# Patient Record
Sex: MALE | Race: WHITE | Employment: FULL TIME | URBAN - NONMETROPOLITAN AREA
[De-identification: names, ages, dates, MRNs, and addresses within clinical notes are randomized per-mention and may not be internally consistent; named-entity substitution may affect disease eponyms.]

---

## 2017-01-19 ENCOUNTER — OFFICE VISIT (OUTPATIENT)
Dept: FAMILY MEDICINE | Facility: OTHER | Age: 32
End: 2017-01-19
Attending: FAMILY MEDICINE
Payer: COMMERCIAL

## 2017-01-19 VITALS
HEIGHT: 69 IN | TEMPERATURE: 98 F | BODY MASS INDEX: 29.92 KG/M2 | RESPIRATION RATE: 16 BRPM | WEIGHT: 202 LBS | DIASTOLIC BLOOD PRESSURE: 78 MMHG | HEART RATE: 66 BPM | SYSTOLIC BLOOD PRESSURE: 120 MMHG

## 2017-01-19 DIAGNOSIS — H66.91 ACUTE OTITIS MEDIA, RIGHT: ICD-10-CM

## 2017-01-19 DIAGNOSIS — Z76.89 ENCOUNTER TO ESTABLISH CARE: ICD-10-CM

## 2017-01-19 DIAGNOSIS — H61.21 IMPACTED CERUMEN OF RIGHT EAR: Primary | ICD-10-CM

## 2017-01-19 PROCEDURE — 99203 OFFICE O/P NEW LOW 30 MIN: CPT | Mod: 25 | Performed by: FAMILY MEDICINE

## 2017-01-19 PROCEDURE — 69210 REMOVE IMPACTED EAR WAX UNI: CPT | Performed by: FAMILY MEDICINE

## 2017-01-19 RX ORDER — AMOXICILLIN 875 MG
875 TABLET ORAL 2 TIMES DAILY
Qty: 14 TABLET | Refills: 0 | Status: SHIPPED | OUTPATIENT
Start: 2017-01-19 | End: 2017-01-26

## 2017-01-19 ASSESSMENT — PAIN SCALES - GENERAL: PAINLEVEL: NO PAIN (0)

## 2017-01-19 NOTE — MR AVS SNAPSHOT
"              After Visit Summary   2017    Selwyn Felipe Jr    MRN: [id number]           Patient Information     Date Of Birth          1985        Visit Information        Provider Department      2017 2:00 PM [facility]i [facility]        Today's Diagnoses     Impacted cerumen of right ear    -  1     Acute otitis media, right         Encounter to establish care           Care Instructions    Complete antibiotic course.  Follow up as needed.        Follow-ups after your visit        Who to contact     If you have questions or need follow up information about today's clinic visit or your schedule please contact [facility] directly at [phone].  Normal or non-critical lab and imaging results will be communicated to you by MyChart, letter or phone within 4 business days after the clinic has received the results. If you do not hear from us within 7 days, please contact the clinic through MyChart or phone. If you have a critical or abnormal lab result, we will notify you by phone as soon as possible.  Submit refill requests through [employer] or call your pharmacy and they will forward the refill request to us. Please allow 3 business days for your refill to be completed.          Additional Information About Your Visit        MyChart Information     [employer] lets you send messages to your doctor, view your test results, renew your prescriptions, schedule appointments and more. To sign up, go to [email or web address] . Click on \"Log in\" on the left side of the screen, which will take you to the Welcome page. Then click on \"Sign up Now\" on the right side of the page.     You will be asked to enter the access code listed below, as well as some personal information. Please follow the directions to create your username and password.     Your access code is: [email or web address]  Expires: 2017  2:18 PM     Your access code will  in 90 days. If you need help or a " "new code, please call your [facility] clinic or [phone].        Care EveryWhere ID     This is your Care EveryWhere ID. This could be used by other organizations to access your [facility] medical records  [id number]        Your Vitals Were     Pulse Temperature Respirations Height BMI (Body Mass Index)       66 98  F (36.7  C) 16 5' 9\" (1.753 m) 29.82 kg/m2        Blood Pressure from Last 3 Encounters:   01/19/17 120/78    Weight from Last 3 Encounters:   01/19/17 202 lb (91.627 kg)              We Performed the Following     REMOVE IMPACTED CERUMEN          Today's Medication Changes          These changes are accurate as of: 1/19/17  2:18 PM.  If you have any questions, ask your nurse or doctor.               Start taking these medicines.        Dose/Directions    amoxicillin 875 MG tablet   Commonly known as:  AMOXIL   Used for:  Acute otitis media, right   Started by:  Yi Eugene MD        Dose:  875 mg   Take 1 tablet (875 mg) by mouth 2 times daily for 7 days   Quantity:  14 tablet   Refills:  0            Where to get your medicines      Some of these will need a paper prescription and others can be bought over the counter.  Ask your nurse if you have questions.     Bring a paper prescription for each of these medications    - amoxicillin 875 MG tablet             Primary Care Provider Office Phone # Fax #    Yi Eugene -[phone]        [facility] [address] [facility] [address]        Thank you!     Thank you for choosing [facility]  for your care. Our goal is always to provide you with excellent care. Hearing back from our patients is one way we can continue to improve our services. Please take a few minutes to complete the written survey that you may receive in the mail after your visit with us. Thank you!             Your Updated Medication List - Protect others around you: Learn how to safely use, store and throw away your medicines at " www.disposemymeds.org.          This list is accurate as of: 1/19/17  2:18 PM.  Always use your most recent med list.                   Brand Name Dispense Instructions for use    amoxicillin 875 MG tablet    AMOXIL    14 tablet    Take 1 tablet (875 mg) by mouth 2 times daily for 7 days

## 2017-01-19 NOTE — NURSING NOTE
"Chief Complaint   Patient presents with     Otalgia       Initial /78 mmHg  Pulse 66  Temp(Src) 98  F (36.7  C)  Resp 16  Ht 5' 9\" (1.753 m)  Wt 202 lb (91.627 kg)  BMI 29.82 kg/m2 Estimated body mass index is 29.82 kg/(m^2) as calculated from the following:    Height as of this encounter: 5' 9\" (1.753 m).    Weight as of this encounter: 202 lb (91.627 kg).  BP completed using cuff size: rene Chester      "

## 2017-01-19 NOTE — PROGRESS NOTES
"SUBJECTIVE:  Selwyn is a 31 year old male who comes in today for right ear pressure for past 2-3 days.  Tried to flush ear with OTC ear wax removal kit without success.  Now, feels like there is water in it.  Denies pain, drainage, history of ear infections.  No fever, sore throat, sinus congestion.    Needs to establish for primary care.   No chronic medical problems.  No records from other facilities.  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    Current Outpatient Prescriptions   Medication     amoxicillin (AMOXIL) 875 MG tablet     No current facility-administered medications for this visit.      No Known Allergies    History reviewed. No pertinent past medical history.  Past Surgical History   Procedure Laterality Date     [place] teeth extraction       Social History     Social History     Marital Status: Single     Spouse Name: N/A     Number of Children: N/A     Years of Education: N/A     Occupational History     Not on file.     Social History Main Topics     Smoking status: Never Smoker      Smokeless tobacco: Not on file     Alcohol Use: Not on file     Drug Use: Not on file     Sexual Activity: Not on file     Other Topics Concern     Not on file     Social History Narrative     No narrative on file       ROS:  General: negative for, fever, chills  Skin: negative for, rash  ENT: positive for as above, right ear feel plugged, negative for, ear pain bilaterally, sinus congestion, postnasal drainage, sore throat, swollen glands  Resp: No shortness of breath and No cough  CV: negative for, palpitations, chest pain and lower extremity edema  Psychiatric: positive for excessive stress, negative for, anxiety and depression    OBJECTIVE:  Filed Vitals:    01/19/17 1359   BP: 120/78   Pulse: 66   Temp: 98  F (36.7  C)   Resp: 16   Height: 5' 9\" (1.753 m)   Weight: 202 lb (91.627 kg)     GENERAL APPEARANCE: healthy, alert and no distress  EYES: EOMI, fundi benign- PERRL  HENT: nose and mouth without " ulcers or lesions and left TM normal, minimal wax in canal; right canal occluded by wax; after lavage, canal is quite erythematous, a bit swollen, TM erythamtous  NECK: no adenopathy, no asymmetry, masses, or scars and thyroid normal to palpation  RESP: lungs clear to auscultation - no rales, rhonchi or wheezes  MS: extremities normal- no gross deformities noted, no evidence of inflammation in joints, FROM in all extremities.  PSYCH: mentation appears normal. and affect normal/bright    ASSESSMENT/ORDERS:    ICD-10-CM    1. Impacted cerumen of right ear H61.21 REMOVE IMPACTED CERUMEN   2. Acute otitis media, right H66.91 amoxicillin (AMOXIL) 875 MG tablet   3. Encounter to establish care Z76.89      PLAN:  Irritation/inflammation vs infection as well.    Cover with course of Amoxicillin.  Follow up for ongoing concerns.    Patient Instructions   Complete antibiotic course.  Follow up as needed.          Yi Kelly

## 2019-08-05 ENCOUNTER — OFFICE VISIT (OUTPATIENT)
Dept: FAMILY MEDICINE | Facility: OTHER | Age: 34
End: 2019-08-05
Attending: FAMILY MEDICINE
Payer: COMMERCIAL

## 2019-08-05 VITALS
SYSTOLIC BLOOD PRESSURE: 126 MMHG | TEMPERATURE: 97.9 F | OXYGEN SATURATION: 97 % | HEART RATE: 77 BPM | RESPIRATION RATE: 19 BRPM | BODY MASS INDEX: 35.44 KG/M2 | WEIGHT: 240 LBS | DIASTOLIC BLOOD PRESSURE: 74 MMHG

## 2019-08-05 DIAGNOSIS — R22.1 NECK MASS: Primary | ICD-10-CM

## 2019-08-05 LAB
BASOPHILS # BLD AUTO: 0 10E9/L (ref 0–0.2)
BASOPHILS NFR BLD AUTO: 0.5 %
DIFFERENTIAL METHOD BLD: NORMAL
EOSINOPHIL # BLD AUTO: 0.1 10E9/L (ref 0–0.7)
EOSINOPHIL NFR BLD AUTO: 0.8 %
ERYTHROCYTE [DISTWIDTH] IN BLOOD BY AUTOMATED COUNT: 12.2 % (ref 10–15)
HCT VFR BLD AUTO: 42.3 % (ref 40–53)
HGB BLD-MCNC: 14.9 G/DL (ref 13.3–17.7)
IMM GRANULOCYTES # BLD: 0 10E9/L (ref 0–0.4)
IMM GRANULOCYTES NFR BLD: 0.2 %
LYMPHOCYTES # BLD AUTO: 1.9 10E9/L (ref 0.8–5.3)
LYMPHOCYTES NFR BLD AUTO: 28.3 %
MCH RBC QN AUTO: 29.5 PG (ref 26.5–33)
MCHC RBC AUTO-ENTMCNC: 35.2 G/DL (ref 31.5–36.5)
MCV RBC AUTO: 84 FL (ref 78–100)
MONOCYTES # BLD AUTO: 0.4 10E9/L (ref 0–1.3)
MONOCYTES NFR BLD AUTO: 6.4 %
NEUTROPHILS # BLD AUTO: 4.2 10E9/L (ref 1.6–8.3)
NEUTROPHILS NFR BLD AUTO: 63.8 %
NRBC # BLD AUTO: 0 10*3/UL
NRBC BLD AUTO-RTO: 0 /100
PLATELET # BLD AUTO: 312 10E9/L (ref 150–450)
RBC # BLD AUTO: 5.05 10E12/L (ref 4.4–5.9)
TSH SERPL DL<=0.005 MIU/L-ACNC: 0.88 MU/L (ref 0.4–4)
WBC # BLD AUTO: 6.6 10E9/L (ref 4–11)

## 2019-08-05 PROCEDURE — 99214 OFFICE O/P EST MOD 30 MIN: CPT | Performed by: FAMILY MEDICINE

## 2019-08-05 PROCEDURE — 84443 ASSAY THYROID STIM HORMONE: CPT | Performed by: FAMILY MEDICINE

## 2019-08-05 PROCEDURE — 36415 COLL VENOUS BLD VENIPUNCTURE: CPT | Performed by: FAMILY MEDICINE

## 2019-08-05 PROCEDURE — 85025 COMPLETE CBC W/AUTO DIFF WBC: CPT | Performed by: FAMILY MEDICINE

## 2019-08-05 ASSESSMENT — PAIN SCALES - GENERAL: PAINLEVEL: NO PAIN (0)

## 2019-08-05 NOTE — NURSING NOTE
"Chief Complaint   Patient presents with     Mass       Initial /74   Pulse 77   Temp 97.9  F (36.6  C) (Tympanic)   Resp 19   Wt 108.9 kg (240 lb)   SpO2 97%   BMI 35.44 kg/m   Estimated body mass index is 35.44 kg/m  as calculated from the following:    Height as of 1/19/17: 1.753 m (5' 9\").    Weight as of this encounter: 108.9 kg (240 lb).  Medication Reconciliation: complete   Candi Frost      "

## 2019-08-05 NOTE — PROGRESS NOTES
Subjective     Selwyn Felipe Jr is a 33 year old male who presents to clinic today for the following health issues:    HPI   Lump in neck      Duration: found it 2 days ago; was feeling some neck pain with swallowing, so palpated glands, wondering about sore throat, and noticed a lump    Description (location/character/radiation): under chin area, left side of Christiano's apple; non-tender; when palpating, some neck pain    Intensity:  0/10    Accompanying signs and symptoms: no pain    History (similar episodes/previous evaluation): None    Precipitating or alleviating factors: None    Therapies tried and outcome: None    No fevers    Some post nasal drainage, start of a cold    No GI symptoms - no pain or vomiting    No sinus symptoms or allergies    Grandparent with multiple myeloma; other unknown family cancer history       No current outpatient medications on file.     No current facility-administered medications for this visit.        There is no problem list on file for this patient.    Past Surgical History:   Procedure Laterality Date     wisdom teeth extraction         Social History     Tobacco Use     Smoking status: Never Smoker   Substance Use Topics     Alcohol use: Not on file     Family History   Problem Relation Age of Onset     Other Cancer Mother         cervical     Other Cancer Father         liver     Other Cancer Maternal Grandfather         pancreatic            Reviewed and updated as needed this visit by Provider         Review of Systems   ROS COMP: Constitutional, HEENT, cardiovascular, pulmonary, gi and gu systems are negative, except as otherwise noted.      Objective    Wt 108.9 kg (240 lb)   BMI 35.44 kg/m    Body mass index is 35.44 kg/m .  Physical Exam   GENERAL: alert, no distress and over weight  EYES: Eyes grossly normal to inspection, PERRL and conjunctivae and sclerae normal  HENT: ear canals and TM's normal, nose and mouth without ulcers or lesions  NECK: cervical adenopathy left  anterior, isolate, minimal tenderness, 2 cm, and thyroid normal to palpation  RESP: lungs clear to auscultation - no rales, rhonchi or wheezes  CV: regular rate and rhythm, normal S1 S2, no S3 or S4, no murmur, click or rub, no peripheral edema and peripheral pulses strong  ABDOMEN: soft, nontender, no hepatosplenomegaly, no masses and bowel sounds normal  MS: no gross musculoskeletal defects noted, no edema  PSYCH: mentation appears normal, affect normal/bright  LYMPH: anterior cervical: left anterior node vs mass as above    Diagnostic Test Results:  pending        Assessment & Plan       ICD-10-CM    1. Neck mass R22.1         Limited prior health care.  Non-smoker.  Discrete, larger mass - suspected node.  Labs today.  CT ordered.      Patient Instructions   Will call with lab results.  CT neck soft tissue ordered.        No follow-ups on file.    Yi Kelly MD  [facility]

## 2019-08-08 ENCOUNTER — TELEPHONE (OUTPATIENT)
Dept: FAMILY MEDICINE | Facility: OTHER | Age: 34
End: 2019-08-08

## 2019-08-08 ENCOUNTER — HOSPITAL ENCOUNTER (OUTPATIENT)
Dept: CT IMAGING | Facility: HOSPITAL | Age: 34
Discharge: HOME | End: 2019-08-08
Attending: FAMILY MEDICINE | Admitting: FAMILY MEDICINE
Payer: COMMERCIAL

## 2019-08-08 DIAGNOSIS — R22.1 NECK MASS: ICD-10-CM

## 2019-08-08 DIAGNOSIS — Q89.2 THYROGLOSSAL DUCT CYST: Primary | ICD-10-CM

## 2019-08-08 PROCEDURE — 25500064 ZZH RX 255 OP 636: Performed by: RADIOLOGY

## 2019-08-08 PROCEDURE — 70491 CT SOFT TISSUE NECK W/DYE: CPT | Mod: TC

## 2019-08-08 RX ADMIN — IOHEXOL 100 ML: 300 INJECTION, SOLUTION INTRAVENOUS at 14:13

## 2019-09-11 ENCOUNTER — OFFICE VISIT (OUTPATIENT)
Dept: OTOLARYNGOLOGY | Facility: OTHER | Age: 34
End: 2019-09-11
Attending: FAMILY MEDICINE
Payer: COMMERCIAL

## 2019-09-11 VITALS
BODY MASS INDEX: 34.5 KG/M2 | DIASTOLIC BLOOD PRESSURE: 68 MMHG | HEART RATE: 81 BPM | HEIGHT: 70 IN | WEIGHT: 241 LBS | TEMPERATURE: 98.6 F | OXYGEN SATURATION: 97 % | SYSTOLIC BLOOD PRESSURE: 130 MMHG

## 2019-09-11 DIAGNOSIS — Q89.2 THYROGLOSSAL DUCT CYST: ICD-10-CM

## 2019-09-11 PROCEDURE — 99213 OFFICE O/P EST LOW 20 MIN: CPT | Mod: 25 | Performed by: PHYSICIAN ASSISTANT

## 2019-09-11 PROCEDURE — 31575 DIAGNOSTIC LARYNGOSCOPY: CPT | Performed by: PHYSICIAN ASSISTANT

## 2019-09-11 ASSESSMENT — PAIN SCALES - GENERAL: PAINLEVEL: NO PAIN (0)

## 2019-09-11 ASSESSMENT — MIFFLIN-ST. JEOR: SCORE: 2054.42

## 2019-09-11 NOTE — PROGRESS NOTES
Otolaryngology Consultation    Patient: Selwyn Felipe Jr  : 1985      Chief Complaint   Patient presents with     Cyst     Pt has been referred by Jabier for a thyroglossal duct cyst.  This was causing neck pain and painful swallowing.  This has now stopped.       HPI:  Selwyn Felipe Jr is a 33 year old male seen today for concerns of thyroglossal duct cyst.     He was seen on 19 for concerns of neck mass. At that visit, it was felt to be possible lymph node.   Selwyn had noted symptoms of neck discomfort and felt a node in the last few months. He had mild discomfort w/ neck movement and felt a bump.   He has no further pain but does feel like a swelling present.     No fevers, night sweats, weight loss.   Denies dysphagia or dysphonia.   No chronic sore throats.   No tobacco use.   No otalgia, otorrhea.     PROCEDURE: CT SOFT TISSUE NECK W CONTRAST 2019 2:20 PM     HISTORY: Neck mass, solitary, afebrile; Neck mass     COMPARISONS: None.     Meds/Dose Given: Omnipaque 300 100ml Given     TECHNIQUE: Axial postcontrast enhanced images with coronal and  sagittal reformatted images.     FINDINGS: There is a bilobed mass in the anterior neck. This lies just  beneath the hyoid bone. There is a central component which lies deep  to the thyroid cartilage and more superficial component more  laterally. Together these measure 2.8 x 1.3 cm. This is most  consistent with a thyroglossal duct cyst.     Salivary glands appear normal and symmetrical. Base the tongue has a  normal appearance. There is no significant prevertebral soft tissue  swelling. Hypopharynx and larynx have a normal appearance.     Thyroid gland enhances homogeneously.     Lung apices are clear except for some probable dependent atelectasis.     No bone lesion is seen.                                      IMPRESSION: Midline and left paramedian neck mass is most consistent  with a thyroglossal duct cyst.     AMY CARDOZO MD  Allergies:  "Patient has no known allergies.     No past medical history on file.    Past Surgical History:   Procedure Laterality Date     wisdom teeth extraction         ENT family history reviewed    Social History     Tobacco Use     Smoking status: Never Smoker   Substance Use Topics     Alcohol use: Not on file     Drug use: Not on file       Review of Systems  ROS: 10 point ROS neg other than the symptoms noted above in the HPI     Physical Exam  /68 (BP Location: Right arm, Cuff Size: Adult Large)   Pulse 81   Temp 98.6  F (37  C) (Tympanic)   Ht 1.778 m (5' 10\")   Wt 109.3 kg (241 lb)   SpO2 97%   BMI 34.58 kg/m    General - The patient is well nourished and well developed, and appears to have good nutritional status.  Alert and oriented to person and place, answers questions and cooperates with examination appropriately.   Head and Face - Normocephalic and atraumatic, with no gross asymmetry noted.  The facial nerve is intact, with strong symmetric movements.  Voice and Breathing - The patient was breathing comfortably without the use of accessory muscles. There was no wheezing, stridor, or stertor.  The patients voice was clear and strong, and had appropriate pitch and quality.  Ears -The external auditory canals are patent, the tympanic membranes are intact without effusion, retraction or mass.  Bony landmarks are intact.  Eyes - Extraocular movements intact, and the pupils were reactive to light.  Sclera were not icteric or injected, conjunctiva were pink and moist.  Mouth - Examination of the oral cavity showed pink, healthy oral mucosa. No lesions or ulcerations noted.  The tongue was mobile and midline, and the dentition were in good condition.    Throat - The walls of the oropharynx were smooth, pink, moist, symmetric, and had no lesions or ulcerations.  The tonsillar pillars and soft palate were symmetric.  The uvula was midline on elevation.    Neck - Normal midline excursion of the laryngotracheal " complex during swallowing.  Full range of motion on passive movement.  Palpation of the occipital, submental, submandibular, internal jugular chain, and supraclavicular nodes did not demonstrate any abnormal lymph nodes or masses.  Palpation of the thyroid was soft and smooth, with no nodules or goiter appreciated.  The trachea was mobile and midline.  There is a 2 cm fixed mass, c/w thyroglossal location. Non tender. No fluctuance.   Nose - External contour is symmetric, no gross deflection or scars.  Nasal mucosa is pink and moist with no abnormal mucus.  The septum and turbinates were evaluated:   No polyps, masses, or purulence noted on examination. DNS and TH.       Flexible Endoscopy -     Attempts at mirror laryngoscopy were not possible due to gag reflex.  Therefore I proceeded with a fiberoptic examination.  First I sprayed both sides of the nose with a mixture of lidocaine and neosynephrine.  I then passed the scope through the nasal cavity.  Color photographs were taken for the permanent medical record.  The nasal cavity was unremarkable.  The nasopharynx was mucosally covered and symmetric.  The Eustachian tube openings were unobstructed.  Going further down I had a clear view of the base of tongue which had normal appearing lingual tonsillar tissue.  The base of tongue was free of lesions, and the vallecula was open.  The epiglottis was smooth and mucosally covered.  The supraglottic larynx was then clearly visualized.  The vocal cords moved smoothly and symmetrically, they were pearly white and no lesions were seen.  The pyriform sinuses were open, and the limited view of the postcricoid region did not show any lesions.          ASSESSMENT:    ICD-10-CM    1. 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 May use Flonase for nasal congestion, TH.     He will follow up with Dr. Pacheco to review surgical options.   Informed  patient, these are generally surgically excised.   Signs and symptoms of infection reviewed and highly recommended excision.       He agrees with this plan.   Education was provided to patient.       Unique Irwin PA-C  [facility]  [phone]

## 2019-09-11 NOTE — PATIENT INSTRUCTIONS
Thyroglossal duct cyst.   Follow up with Dr. Pacheco for recheck. Consider surgical excision.   Monitor symptoms- if increase in size- return to ENT for recheck    Consider trial of Flonase for nasal symptoms.     Thank you for allowing Unique Irwin PA-C and our ENT team to participate in your care.  If your medications are too expensive, please give the nurse a call.  We can possibly change this medication.  If you have a scheduling or an appointment question please contact our Health Unit Coordinator at their direct line [phone].   ALL nursing questions or concerns can be directed to your ENT nurse at: [phone] Emelina

## 2019-09-11 NOTE — LETTER
2019         RE: Selwyn Felipe Jr  [address]  [place] [address]        Dear Colleague,    Thank you for referring your patient, Selwyn Felipe Jr, to the [facility]. Please see a copy of my visit note below.    Otolaryngology Consultation    Patient: Selwyn Felipe Jr  : 1985      Chief Complaint   Patient presents with     Cyst     Pt has been referred by Jabier for a thyroglossal duct cyst.  This was causing neck pain and painful swallowing.  This has now stopped.       HPI:  Selwyn Felipe Jr is a 33 year old male seen today for concerns of thyroglossal duct cyst.     He was seen on 19 for concerns of neck mass. At that visit, it was felt to be possible lymph node.   Selwyn had noted symptoms of neck discomfort and felt a node in the last few months. He had mild discomfort w/ neck movement and felt a bump.   He has no further pain but does feel like a swelling present.     No fevers, night sweats, weight loss.   Denies dysphagia or dysphonia.   No chronic sore throats.   No tobacco use.   No otalgia, otorrhea.     PROCEDURE: CT SOFT TISSUE NECK W CONTRAST 2019 2:20 PM     HISTORY: Neck mass, solitary, afebrile; Neck mass     COMPARISONS: None.     Meds/Dose Given: Omnipaque 300 100ml Given     TECHNIQUE: Axial postcontrast enhanced images with coronal and  sagittal reformatted images.     FINDINGS: There is a bilobed mass in the anterior neck. This lies just  beneath the hyoid bone. There is a central component which lies deep  to the thyroid cartilage and more superficial component more  laterally. Together these measure 2.8 x 1.3 cm. This is most  consistent with a thyroglossal duct cyst.     Salivary glands appear normal and symmetrical. Base the tongue has a  normal appearance. There is no significant prevertebral soft tissue  swelling. Hypopharynx and larynx have a normal appearance.     Thyroid gland enhances homogeneously.     Lung apices are clear except for  "some probable dependent atelectasis.     No bone lesion is seen.                                      IMPRESSION: Midline and left paramedian neck mass is most consistent  with a thyroglossal duct cyst.     AMY CARDOZO MD  Allergies: Patient has no known allergies.     No past medical history on file.    Past Surgical History:   Procedure Laterality Date     wisdom teeth extraction         ENT family history reviewed    Social History     Tobacco Use     Smoking status: Never Smoker   Substance Use Topics     Alcohol use: Not on file     Drug use: Not on file       Review of Systems  ROS: 10 point ROS neg other than the symptoms noted above in the HPI     Physical Exam  /68 (BP Location: Right arm, Cuff Size: Adult Large)   Pulse 81   Temp 98.6  F (37  C) (Tympanic)   Ht 1.778 m (5' 10\")   Wt 109.3 kg (241 lb)   SpO2 97%   BMI 34.58 kg/m     General - The patient is well nourished and well developed, and appears to have good nutritional status.  Alert and oriented to person and place, answers questions and cooperates with examination appropriately.   Head and Face - Normocephalic and atraumatic, with no gross asymmetry noted.  The facial nerve is intact, with strong symmetric movements.  Voice and Breathing - The patient was breathing comfortably without the use of accessory muscles. There was no wheezing, stridor, or stertor.  The patients voice was clear and strong, and had appropriate pitch and quality.  Ears -The external auditory canals are patent, the tympanic membranes are intact without effusion, retraction or mass.  Bony landmarks are intact.  Eyes - Extraocular movements intact, and the pupils were reactive to light.  Sclera were not icteric or injected, conjunctiva were pink and moist.  Mouth - Examination of the oral cavity showed pink, healthy oral mucosa. No lesions or ulcerations noted.  The tongue was mobile and midline, and the dentition were in good condition.    Throat - The " walls of the oropharynx were smooth, pink, moist, symmetric, and had no lesions or ulcerations.  The tonsillar pillars and soft palate were symmetric.  The uvula was midline on elevation.    Neck - Normal midline excursion of the laryngotracheal complex during swallowing.  Full range of motion on passive movement.  Palpation of the occipital, submental, submandibular, internal jugular chain, and supraclavicular nodes did not demonstrate any abnormal lymph nodes or masses.  Palpation of the thyroid was soft and smooth, with no nodules or goiter appreciated.  The trachea was mobile and midline.  There is a 2 cm fixed mass, c/w thyroglossal location. Non tender. No fluctuance.   Nose - External contour is symmetric, no gross deflection or scars.  Nasal mucosa is pink and moist with no abnormal mucus.  The septum and turbinates were evaluated:   No polyps, masses, or purulence noted on examination. DNS and TH.       Flexible Endoscopy -     Attempts at mirror laryngoscopy were not possible due to gag reflex.  Therefore I proceeded with a fiberoptic examination.  First I sprayed both sides of the nose with a mixture of lidocaine and neosynephrine.  I then passed the scope through the nasal cavity.  Color photographs were taken for the permanent medical record.  The nasal cavity was unremarkable.  The nasopharynx was mucosally covered and symmetric.  The Eustachian tube openings were unobstructed.  Going further down I had a clear view of the base of tongue which had normal appearing lingual tonsillar tissue.  The base of tongue was free of lesions, and the vallecula was open.  The epiglottis was smooth and mucosally covered.  The supraglottic larynx was then clearly visualized.  The vocal cords moved smoothly and symmetrically, they were pearly white and no lesions were seen.  The pyriform sinuses were open, and the limited view of the postcricoid region did not show any lesions.          ASSESSMENT:    ICD-10-CM    1.  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 May use Flonase for nasal congestion, TH.     He will follow up with Dr. Pacheco to review surgical options.   Informed patient, these are generally surgically excised.   Signs and symptoms of infection reviewed and highly recommended excision.       He agrees with this plan.   Education was provided to patient.       Unique Irwin PA-C  ENT  [facility]  [phone]          Again, thank you for allowing me to participate in the care of your patient.        Sincerely,        Unique Irwin PA-C

## 2019-09-11 NOTE — NURSING NOTE
"Chief Complaint   Patient presents with     Cyst     Pt has been referred by Jabier for a thyroglossal duct cyst.  This was causing neck pain and painful swallowing.  This has now stopped.       Initial /68 (BP Location: Right arm, Cuff Size: Adult Large)   Pulse 81   Temp 98.6  F (37  C) (Tympanic)   Ht 1.778 m (5' 10\")   Wt 109.3 kg (241 lb)   SpO2 97%   BMI 34.58 kg/m   Estimated body mass index is 34.58 kg/m  as calculated from the following:    Height as of this encounter: 1.778 m (5' 10\").    Weight as of this encounter: 109.3 kg (241 lb).  Medication Reconciliation: complete   Emelina Ferguson LPN      "

## 2019-10-07 ENCOUNTER — OFFICE VISIT (OUTPATIENT)
Dept: OTOLARYNGOLOGY | Facility: OTHER | Age: 34
End: 2019-10-07
Attending: OTOLARYNGOLOGY
Payer: COMMERCIAL

## 2019-10-07 VITALS
HEART RATE: 78 BPM | DIASTOLIC BLOOD PRESSURE: 70 MMHG | BODY MASS INDEX: 34.5 KG/M2 | WEIGHT: 241 LBS | HEIGHT: 70 IN | OXYGEN SATURATION: 96 % | TEMPERATURE: 97.5 F | SYSTOLIC BLOOD PRESSURE: 104 MMHG

## 2019-10-07 DIAGNOSIS — Q89.2 THYROGLOSSAL DUCT CYST: Primary | ICD-10-CM

## 2019-10-07 PROCEDURE — 99214 OFFICE O/P EST MOD 30 MIN: CPT | Performed by: OTOLARYNGOLOGY

## 2019-10-07 ASSESSMENT — PAIN SCALES - GENERAL: PAINLEVEL: MILD PAIN (2)

## 2019-10-07 ASSESSMENT — MIFFLIN-ST. JEOR: SCORE: 2054.42

## 2019-10-07 NOTE — PATIENT INSTRUCTIONS
Thank you for allowing Dr. Pacheco and our ENT team to participate in your care.  If your medications are too expensive, please give the nurse a call.  We can possibly change this medication.  If you have a scheduling or an appointment question please contact our Health Unit Coordinator at their direct line [phone].   ALL nursing questions or concerns can be directed to your ENT nurse at: [phone] - Michelle    Follow up in surgery for Excision Thyroglossal Duct Cyst        HOW TO PREPARE-      You need to have a scheduled Pre-Op with your primary care physician within 30 days of your scheduled surgery.        You need a friend or family member available to drive you home AND stay with you for 24 hours after you leave the hospital. You will not be allowed to drive yourself. IF you need to take a taxi or the bus you MUST have a responsible person to ride with you. YOUR PROCEDURE WILL BE CANCELLED IF YOU DO NOT HAVE A RESPONSIBLE ADULT TO DRIVE YOU HOME.       You CANNOT have anything to eat or drink after midnight the night before your surgery, including water and coffee. Your stomach needs to be completely empty. Do NOT chew gum, suck on hard candy, or smoke. You can brush your teeth the morning of surgery.       The Surgery Education Nurses will call you  1-2 weeks prior to your surgery date at  [phone] or toll free [phone]. Please have your medication and allergy lists ready.      Stop your aspirin or other NSAIDs(Ibuprofen, Motrin, Aleve, Celebrex, Naproxen, etc...) 7 days before your surgery.      Hospital admitting will call you the day before your surgery with your exact arrival time.       Please call your primary care physician if you should become ill within 24 hours of scheduled surgery. (ex.vomiting, diarrhea, fever)          You will need to wash the night before AND the morning of you procedure with a liquid antibacterial soap, like Dial.

## 2019-10-07 NOTE — LETTER
"    10/7/2019         RE: Selwyn Felipe Jr  [address]  [place] [address]        Dear Colleague,    Thank you for referring your patient, Selwyn Felipe Jr, to the [facility]. Please see a copy of my visit note below.    Otolaryngology Progress Note          Selwyn Felipe Jr is a 33 year old male presents for  Follow-up of a thyroglossal duct cyst    He was seen by Unique on September 11 for central neck mass which is been present for over 1 month  He noticed some right neck muscle tightness in august and felt his neck, noting a neck mass       No fevers, night sweats, weight loss.   Denies dysphagia or dysphonia.   No chronic sore throats.   No tobacco use.   No otalgia, otorrhea.     CT soft tissue neck reviewed with Selwyn and there is a midline cystic mass just deep to the hyoid bone measuring nearly 3 cm  The thyroid gland is in its normal anatomic location    Flexible laryngoscopy by Unique at the last visit revealed no endolaryngeal mass    Never tobacco use    Physical Exam  /70   Pulse 78   Temp 97.5  F (36.4  C) (Tympanic)   Ht 1.778 m (5' 10\")   Wt 109.3 kg (241 lb)   SpO2 96%   BMI 34.58 kg/m     General - The patient is well nourished and well developed, and appears to have good nutritional status.  Alert and oriented to person and place, interactive.  Head and Face - Normocephalic and atraumatic, with no gross asymmetry noted of the contour of the facial features.  The facial nerve is intact, with strong symmetric movements.  Neck-there is a central to left upper neck slightly tender cystic mass about 3 cm which elevates with tongue protrusion just at the hyoid bone.  No palpable lymphadenopathy or thyroid mass, thyroid palpable in lower neck.  Trachea is midline.  Eyes - Extraocular movements intact.   Ears- External auditory canals are patent, tympanic membranes are intact without effusion or worrisome retractions   Nose - Nasal mucosa is pink and moist with no abnormal " mucus.  The septum was grossly midline and non-obstructive, turbinates of normal size and position.  No polyps, masses, or purulence noted on examination.  Mouth - Examination of the oral cavity shows pink, healthy, moist mucosa.  No lesions or ulceration noted.  The dentition are in good repair.  The tongue is mobile and midline.  Throat - The walls of the oropharynx were smooth, pink, moist, symmetric, and had no lesions or ulcerations.  The tonsillar pillars and soft palate were symmetric.  The uvula was midline on elevation.      Imaging  8/8/19  The CT scan does show a cystic mass just deep to the hyoid bone that extends to the left paramedian neck there is no cervical lymphadenopathy thyroid is normal in appearance the vallecula is clear      Impression/Plan  Selwyn Felipe Jr is a 33 year old male    ICD-10-CM    1. 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  I discussed  the possibility of needing repeat surgery for possible recurrence.     Selwyn would have a drain postoperatively and this would need to be removed within 2 to 3 days after surgery    Surgery and neck anatomy was discussed thyroglossal duct embryology was discussed            Shayy Pacheco D.O.  Otolaryngology/Head and Neck Surgery  Allergy            Again, thank you for allowing me to participate in the care of your patient.        Sincerely,        Shayy Pacheco MD

## 2019-10-07 NOTE — NURSING NOTE
"Chief Complaint   Patient presents with     RECHECK     Follow Up Thyroglossal Duct Cyst       Initial /70   Pulse 78   Temp 97.5  F (36.4  C) (Tympanic)   Ht 1.778 m (5' 10\")   Wt 109.3 kg (241 lb)   SpO2 96%   BMI 34.58 kg/m   Estimated body mass index is 34.58 kg/m  as calculated from the following:    Height as of this encounter: 1.778 m (5' 10\").    Weight as of this encounter: 109.3 kg (241 lb).  Medication Reconciliation: complete  Ashley Jon LPN  "

## 2019-10-07 NOTE — PROGRESS NOTES
"Otolaryngology Progress Note          Selwyn DE JESUS Matteo Fitzgerald is a 33 year old male presents for  Follow-up of a thyroglossal duct cyst    He was seen by Unique on September 11 for central neck mass which is been present for over 1 month  He noticed some right neck muscle tightness in august and felt his neck, noting a neck mass       No fevers, night sweats, weight loss.   Denies dysphagia or dysphonia.   No chronic sore throats.   No tobacco use.   No otalgia, otorrhea.     CT soft tissue neck reviewed with Selwyn and there is a midline cystic mass just deep to the hyoid bone measuring nearly 3 cm  The thyroid gland is in its normal anatomic location    Flexible laryngoscopy by Unique at the last visit revealed no endolaryngeal mass    Never tobacco use    Physical Exam  /70   Pulse 78   Temp 97.5  F (36.4  C) (Tympanic)   Ht 1.778 m (5' 10\")   Wt 109.3 kg (241 lb)   SpO2 96%   BMI 34.58 kg/m    General - The patient is well nourished and well developed, and appears to have good nutritional status.  Alert and oriented to person and place, interactive.  Head and Face - Normocephalic and atraumatic, with no gross asymmetry noted of the contour of the facial features.  The facial nerve is intact, with strong symmetric movements.  Neck-there is a central to left upper neck slightly tender cystic mass about 3 cm which elevates with tongue protrusion just at the hyoid bone.  No palpable lymphadenopathy or thyroid mass, thyroid palpable in lower neck.  Trachea is midline.  Eyes - Extraocular movements intact.   Ears- External auditory canals are patent, tympanic membranes are intact without effusion or worrisome retractions   Nose - Nasal mucosa is pink and moist with no abnormal mucus.  The septum was grossly midline and non-obstructive, turbinates of normal size and position.  No polyps, masses, or purulence noted on examination.  Mouth - Examination of the oral cavity shows pink, healthy, moist mucosa.  No lesions or " ulceration noted.  The dentition are in good repair.  The tongue is mobile and midline.  Throat - The walls of the oropharynx were smooth, pink, moist, symmetric, and had no lesions or ulcerations.  The tonsillar pillars and soft palate were symmetric.  The uvula was midline on elevation.      Imaging  8/8/19  The CT scan does show a cystic mass just deep to the hyoid bone that extends to the left paramedian neck there is no cervical lymphadenopathy thyroid is normal in appearance the vallecula is clear      Impression/Plan  Selwyn Felipe Jr is a 33 year old male    ICD-10-CM    1. 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  I discussed  the possibility of needing repeat surgery for possible recurrence.     Selwyn would have a drain postoperatively and this would need to be removed within 2 to 3 days after surgery    Surgery and neck anatomy was discussed thyroglossal duct embryology was discussed            Shayy Pacheco D.O.  Otolaryngology/Head and Neck Surgery  Allergy

## 2021-10-06 ENCOUNTER — NURSE TRIAGE (OUTPATIENT)
Dept: FAMILY MEDICINE | Facility: OTHER | Age: 36
End: 2021-10-06

## 2021-10-06 DIAGNOSIS — Z20.822 EXPOSURE TO 2019 NOVEL CORONAVIRUS: Primary | ICD-10-CM

## 2021-10-06 NOTE — TELEPHONE ENCOUNTER
Reason for Disposition    [1] CLOSE CONTACT COVID-19 EXPOSURE within last 14 days AND [2] NO symptoms    [1] COVID-19 vaccine series completed (fully vaccinated) AND [2] COVID-19 EXPOSURE AND [3] no symptoms    [1] Completed COVID-19 vaccine series (fully vaccinated) AND [2]  COVID-19 exposure AND [3] no symptoms    Additional Information    Negative: COVID-19 lab test positive    Negative: [1] Lives with someone known to have influenza (flu test positive) AND [2] flu-like symptoms (e.g., cough, runny nose, sore throat, SOB; with or without fever)    Negative: [1] Symptoms of COVID-19 (e.g., cough, fever, SOB, or others) AND [2] HCP diagnosed COVID-19 based on symptoms    Negative: [1] Symptoms of COVID-19 (e.g., cough, fever, SOB, or others) AND [2] lives in an area with community spread    Negative: [1] Symptoms of COVID-19 (e.g., cough, fever, SOB, or others) AND [2] within 14 days of EXPOSURE (close contact) with diagnosed or suspected COVID-19 patient    Negative: [1] Symptoms of COVID-19 (e.g., cough, fever, SOB, or others) AND [2] within 14 days of travel from high-risk area for COVID-19 community spread (identified by CDC)    Negative: [1] Difficulty breathing (shortness of breath) occurs AND [2] onset > 14 days after COVID-19 EXPOSURE (Close Contact)    Negative: [1] Dry cough occurs AND [2] onset > 14 days after COVID-19 EXPOSURE    Negative: [1] Wet cough (i.e., white-yellow, yellow, green, or melanie colored sputum) AND [2] onset > 14 days after COVID-19 EXPOSURE    Negative: [1] Common cold symptoms AND [2] onset > 14 days after COVID-19 EXPOSURE    Negative: COVID-19 vaccine reaction suspected (e.g., fever, headache, muscle aches) occurring during days 1-3 after getting vaccine    Negative: COVID-19 vaccine, questions about    Negative: [1] CLOSE CONTACT COVID-19 EXPOSURE within last 14 days AND [2] needs COVID-19 lab test to return to work AND [3] NO symptoms    Negative: [1] CLOSE CONTACT COVID-19  EXPOSURE within last 14 days AND [2] exposed person is a  (e.g., police or paramedic) AND [3] NO symptoms    Negative: [1] CLOSE CONTACT COVID-19 EXPOSURE within last 14 days AND [2] exposed person is a healthcare worker who was NOT using all recommended personal protective equipment (e.g., a respirator-N95 mask, eye protection, gloves, and gown) AND [3] NO symptoms    Negative: [1] Living or working in a correctional facility, long-term care facility, or shelter (i.e., congregate setting; densely populated) AND [2] where an outbreak has occurred AND [3] NO symptoms    Negative: [1] Difficulty breathing or swallowing AND [2] starts within 2 hours after injection    Negative: Sounds like a life-threatening emergency to the triager    Negative: [1] Has NOT completed COVID-19 vaccine series AND [2] COVID-19 exposure AND [2] AND [3] typical COVID-19 symptoms    Negative: [1] Has NOT completed COVID-19 vaccine series AND [2]  COVID-19 exposure AND [3] no symptoms    Negative: [1] COVID-19 vaccine series completed (fully vaccinated) in past 3 months AND [2] new-onset of COVID-19 symptoms BUT [3] no known exposure    Negative: [1] Typical COVID-19 symptoms AND [2] symptoms that are NOT expected from vaccine (e.g., cough, difficulty breathing, loss of taste or smell, runny nose, sore throat)    Negative: [1] Typical COVID-19 symptoms AND [2] started > 3 days after getting vaccine    Negative: Fever > 104 F (40 C)    Negative: Sounds like a severe, unusual reaction to the triager    Negative: [1] Redness or red streak around the injection site AND [2] started > 48 hours after getting vaccine AND [3] fever    Negative: [1] Fever > 101 F (38.3 C) AND [2] age > 60 years AND [3] started > 48 hours after getting vaccine    Negative: [1] Fever > 100.0 F (37.8 C) AND [2] bedridden (e.g., nursing home patient, CVA, chronic illness, recovering from surgery) AND [3] started > 48 hours after getting vaccine    Negative:  "[1] Fever > 100.0 F (37.8 C) AND [2] diabetes mellitus or weak immune system (e.g., HIV positive, cancer chemo, splenectomy, organ transplant, chronic steroids) AND [3] started > 48 hours after getting vaccine    Negative: [1] Redness or red streak around the injection site AND [2] started > 48 hours after getting vaccine AND [3] no fever  (Exception: red area < 1 inch or 2.5 cm wide)    Negative: [1] Pain, tenderness, or swelling at the injection site AND [2] over 3 days (72 hours) since vaccine AND [3] getting worse    Negative: Fever > 100.0 F (37.8 C) present > 3 days (72 hours)    Negative: [1] Fever > 100.0 F (37.8 C) AND [2] healthcare worker    Negative: [1] Requesting COVID-19 vaccine AND [2] healthcare worker (e.g., EMS first responders, doctors, nurses)    Negative: [1] Requesting COVID-19 vaccine AND [2] resident of a long-term care facility (e.g., nursing home)    Negative: [1] Requesting COVID-19 vaccine AND [2] vaccine available in the community for this patient group    Negative: [1] Pain, tenderness, or swelling at the injection site AND [2] lasts > 7 days    Negative: [1] Lymph node swelling (i.e., armpit or neck on side of vaccine) AND [2] lasts > 3 weeks    Negative: COVID-19 vaccine, injection site reaction (e.g., pain, redness, swelling), question about    Negative: COVID-19 vaccine, systemic reactions (e.g., fatigue, fever, muscle aches), questions about    Negative: COVID-19 vaccine, Frequently Asked Questions (FAQs)    Negative: COVID-19 vaccine and fully vaccinated (What can I do now?), Frequently Asked Questions (FAQs)    Negative: COVID-19 Prevention and Healthy Living, questions about    Answer Assessment - Initial Assessment Questions  1. COVID-19 CLOSE CONTACT: \"Who is the person with the confirmed or suspected COVID-19 infection that you were exposed to?\"      wife  2. PLACE of CONTACT: \"Where were you when you were exposed to COVID-19?\" (e.g., home, school, medical waiting room; " "which city?)      home  3. TYPE of CONTACT: \"How much contact was there?\" (e.g., sitting next to, live in same house, work in same office, same building)      Lives together  4. DURATION of CONTACT: \"How long were you in contact with the COVID-19 patient?\" (e.g., a few seconds, passed by person, a few minutes, 15 minutes or longer, live with the patient)      Lives together  5. MASK: \"Were you wearing a mask?\" \"Was the other person wearing a mask?\" Note: wearing a mask reduces the risk of an otherwise close contact.      no  6. DATE of CONTACT: \"When did you have contact with a COVID-19 patient?\" (e.g., how many days ago)      today  7. COMMUNITY SPREAD: \"Are there lots of cases of COVID-19 (community spread) where you live?\" (See public health department website, if unsure)        yes  8. SYMPTOMS: \"Do you have any symptoms?\" (e.g., fever, cough, breathing difficulty, loss of taste or smell)      no  9. PREGNANCY OR POSTPARTUM: \"Is there any chance you are pregnant?\" \"When was your last menstrual period?\" \"Did you deliver in the last 2 weeks?\"      na  10. HIGH RISK: \"Do you have any heart or lung problems?\" \"Do you have a weak immune system?\" (e.g., heart failure, COPD, asthma, HIV positive, chemotherapy, renal failure, diabetes mellitus, sickle cell anemia, obesity)        no  11. TRAVEL: \"Have you traveled out of the country recently?\" If so, \"When and where?\" Also ask about out-of-state travel, since the [facility][note 3] has identified some high-risk cities for community spread in the . Note: Travel becomes less relevant if there is widespread community transmission where the patient lives.        no    Protocols used: CORONAVIRUS (COVID-19) EXPOSURE-A- 3.25, CORONAVIRUS (COVID-19) VACCINE QUESTIONS AND [id number]-WC 3.25      "

## 2021-10-07 ENCOUNTER — OFFICE VISIT (OUTPATIENT)
Dept: FAMILY MEDICINE | Facility: OTHER | Age: 36
End: 2021-10-07
Attending: FAMILY MEDICINE
Payer: COMMERCIAL

## 2021-10-07 DIAGNOSIS — Z20.822 EXPOSURE TO 2019 NOVEL CORONAVIRUS: ICD-10-CM

## 2021-10-07 PROCEDURE — U0005 INFEC AGEN DETEC AMPLI PROBE: HCPCS

## 2021-10-07 PROCEDURE — U0003 INFECTIOUS AGENT DETECTION BY NUCLEIC ACID (DNA OR RNA); SEVERE ACUTE RESPIRATORY SYNDROME CORONAVIRUS 2 (SARS-COV-2) (CORONAVIRUS DISEASE [COVID-19]), AMPLIFIED PROBE TECHNIQUE, MAKING USE OF HIGH THROUGHPUT TECHNOLOGIES AS DESCRIBED BY CMS-2020-01-R: HCPCS

## 2021-10-09 LAB — SARS-COV-2 RNA RESP QL NAA+PROBE: NEGATIVE

## 2021-11-27 ENCOUNTER — HEALTH MAINTENANCE LETTER (OUTPATIENT)
Age: 36
End: 2021-11-27

## 2022-09-04 ENCOUNTER — HEALTH MAINTENANCE LETTER (OUTPATIENT)
Age: 37
End: 2022-09-04

## 2023-01-15 ENCOUNTER — HEALTH MAINTENANCE LETTER (OUTPATIENT)
Age: 38
End: 2023-01-15

## 2024-02-17 ENCOUNTER — HEALTH MAINTENANCE LETTER (OUTPATIENT)
Age: 39
End: 2024-02-17